# Patient Record
Sex: MALE | Race: WHITE
[De-identification: names, ages, dates, MRNs, and addresses within clinical notes are randomized per-mention and may not be internally consistent; named-entity substitution may affect disease eponyms.]

---

## 2018-01-21 ENCOUNTER — HOSPITAL ENCOUNTER (INPATIENT)
Dept: HOSPITAL 74 - FER | Age: 68
LOS: 3 days | Discharge: HOME | DRG: 378 | End: 2018-01-24
Attending: NURSE PRACTITIONER | Admitting: INTERNAL MEDICINE
Payer: COMMERCIAL

## 2018-01-21 VITALS — BODY MASS INDEX: 37.9 KG/M2

## 2018-01-21 DIAGNOSIS — Z87.891: ICD-10-CM

## 2018-01-21 DIAGNOSIS — R19.7: ICD-10-CM

## 2018-01-21 DIAGNOSIS — K55.9: ICD-10-CM

## 2018-01-21 DIAGNOSIS — R42: ICD-10-CM

## 2018-01-21 DIAGNOSIS — I48.0: ICD-10-CM

## 2018-01-21 DIAGNOSIS — D72.829: ICD-10-CM

## 2018-01-21 DIAGNOSIS — E86.0: ICD-10-CM

## 2018-01-21 DIAGNOSIS — Z86.010: ICD-10-CM

## 2018-01-21 DIAGNOSIS — K62.5: Primary | ICD-10-CM

## 2018-01-21 LAB
ALBUMIN SERPL-MCNC: 4.1 G/DL (ref 3.5–5)
ALP SERPL-CCNC: 56 U/L (ref 32–92)
ALT SERPL-CCNC: 29 U/L (ref 10–40)
ANION GAP SERPL CALC-SCNC: 8 MMOL/L (ref 8–16)
AST SERPL-CCNC: 28 U/L (ref 10–42)
BASOPHILS # BLD: 1.8 % (ref 0–2)
BILIRUB SERPL-MCNC: 0.8 MG/DL (ref 0.2–1)
BUN SERPL-MCNC: 14 MG/DL (ref 7–18)
CALCIUM SERPL-MCNC: 9.5 MG/DL (ref 8.4–10.2)
CHLORIDE SERPL-SCNC: 97 MMOL/L (ref 98–107)
CO2 SERPL-SCNC: 27 MMOL/L (ref 22–28)
CREAT SERPL-MCNC: 1 MG/DL (ref 0.6–1.3)
DEPRECATED RDW RBC AUTO: 13.2 % (ref 11.9–15.9)
EOSINOPHIL # BLD: 0.3 % (ref 0–4.5)
GLUCOSE SERPL-MCNC: 114 MG/DL (ref 74–106)
HCT VFR BLD CALC: 48.1 % (ref 35.4–49)
HGB BLD-MCNC: 15.6 GM/DL (ref 11.7–16.9)
LIPASE SERPL-CCNC: 18 U/L (ref 22–51)
LYMPHOCYTES # BLD: 9.7 % (ref 8–40)
MCH RBC QN AUTO: 28.6 PG (ref 25.7–33.7)
MCHC RBC AUTO-ENTMCNC: 32.5 G/DL (ref 32–35.9)
MCV RBC: 88 FL (ref 80–96)
MONOCYTES # BLD AUTO: 5.6 % (ref 3.8–10.2)
NEUTROPHILS # BLD: 82.6 % (ref 42.8–82.8)
PLATELET # BLD AUTO: 255 K/MM3 (ref 134–434)
PMV BLD: 9.4 FL (ref 7.5–11.1)
POTASSIUM SERPLBLD-SCNC: 4.2 MMOL/L (ref 3.5–5.1)
PROT SERPL-MCNC: 6.8 G/DL (ref 6.4–8.3)
RBC # BLD AUTO: 5.47 M/MM3 (ref 4–5.6)
SODIUM SERPL-SCNC: 132 MMOL/L (ref 136–145)
WBC # BLD AUTO: 15.2 K/MM3 (ref 4–10.8)

## 2018-01-21 PROCEDURE — G0378 HOSPITAL OBSERVATION PER HR: HCPCS

## 2018-01-21 NOTE — HP
CHIEF COMPLAINT: Rectal Bleeding, Diarrhea





PCP: Dr. Christopher Villarreal





HISTORY OF PRESENT ILLNESS:


This is a 66 y/o man with a PMHx of Afib (on Tenormin). Who presents to the ED 

with diarrhea and rectal bleeding x 2 days. Patient reports having 7 Dental 

Implants last Tuesday and was started on Augmentin. He reports having diarrhea 

on Thursday. He reports that with the stools he noted blood. He reports having 

25 episodes of diarrhea. Now during the interview, patient reports having BRBPR 

with clots, abdominal cramping and pressure. Patient reports having a baseline 

Colonoscopy age 48- polyps, Repeat 3 yrs ago- negative, per patient. Patient 

denies fever, chills, cough, SOB, CP, palpitations, N/V, constipation, dysuria.





ER course was notable for:


(1) Stool occult-negative


(2) WBC 15.2


(3) Na 132





Recent Travel: None





PAST MEDICAL HISTORY:


Afib





PAST SURGICAL HISTORY:


Colonoscopy





Social History:


Smoking: Former 20yrs ago


Alcohol:  None


Drugs:    None





Family History:





Allergies





No Known Allergies Allergy (Verified 01/21/18 19:28)


 








HOME MEDICATIONS:


 Home Medications











 Medication  Instructions  Recorded


 


Atenolol [Tenormin -] 25 mg PO BID 01/21/18








REVIEW OF SYSTEMS


CONSTITUTIONAL: 


Absent:  fever, chills, diaphoresis, generalized weakness, malaise, loss of 

appetite, weight change


HEENT: 


Absent:  rhinorrhea, nasal congestion, throat pain, throat swelling, difficulty 

swallowing, mouth swelling, ear pain, eye pain, visual changes


CARDIOVASCULAR: 


Absent: chest pain, syncope, palpitations, irregular heart rate, lightheadedness

, peripheral edema


RESPIRATORY: 


Absent: cough, shortness of breath, dyspnea with exertion, orthopnea, wheezing, 

stridor, hemoptysis


GASTROINTESTINAL: abdominal pain, vomiting, diarrhea, hematochezia


Absent: abdominal distension, nausea, constipation, melena


GENITOURINARY: 


Absent: dysuria, frequency, urgency, hesitancy, hematuria, flank pain, genital 

pain


MUSCULOSKELETAL: 


Absent: myalgia, arthralgia, joint swelling, back pain, neck pain


SKIN: 


Absent: rash, itching, pallor


HEMATOLOGIC/IMMUNOLOGIC: 


Absent: easy bleeding, easy bruising, lymphadenopathy, frequent infections


ENDOCRINE:


Absent: unexplained weight gain, unexplained weight loss, heat intolerance, 

cold intolerance


NEUROLOGIC: 


Absent: headache, focal weakness or paresthesias, dizziness, unsteady gait, 

seizure, mental status changes, bladder or bowel incontinence


PSYCHIATRIC: 


Absent: anxiety, depression, suicidal or homicidal ideation, hallucinations.








PHYSICAL EXAMINATION


 Vital Signs - 24 hr











  01/21/18 01/21/18 01/21/18





  19:31 21:20 22:00


 


Temperature 97.4 F L  


 


Pulse Rate 60  


 


Pulse Rate [  63 





Left]   


 


Pulse Rate [   60





Right]   


 


Respiratory 16 16 





Rate   


 


Blood Pressure 153/85  


 


Blood Pressure  130/69 132/73





[Left]   


 


O2 Sat by Pulse 100  97





Oximetry (%)   














  01/21/18 01/21/18





  22:05 22:10


 


Temperature  


 


Pulse Rate  


 


Pulse Rate [  61





Left]  


 


Pulse Rate [ 61 





Right]  


 


Respiratory  





Rate  


 


Blood Pressure  


 


Blood Pressure 121/74 132/78





[Left]  


 


O2 Sat by Pulse 95 95





Oximetry (%)  











GENERAL: Awake, alert, and fully oriented, in no acute distress.


HEAD: Normal with no signs of trauma.


EYES: Pupils equal, round and reactive to light, extraocular movements intact, 

sclera anicteric, conjunctiva clear. No lid lag.


EARS, NOSE, THROAT: Ears normal, nares patent, oropharynx clear without 

exudates. Moist mucous membranes.


NECK: Normal range of motion, supple without lymphadenopathy, JVD, or masses.


LUNGS: Breath sounds equal, clear to auscultation bilaterally. No wheezes, and 

no crackles. No accessory muscle use.


HEART: Regular rate and rhythm, normal S1 and S2 without murmur, rub or gallop.


ABDOMEN: Soft, not distended, no guarding, no rebound, no masses.  No 

hepatomegaly or  splenomegaly. lower abdominal tenderness,  hyperactive bowel 

sounds,


RECTUM: No active bleed


MUSCULOSKELETAL: Normal range of motion at all joints. No bony deformities or 

tenderness. No CVA tenderness.


UPPER EXTREMITIES: 2+ pulses, warm, well-perfused. No cyanosis. No clubbing. No 

peripheral edema.


LOWER EXTREMITIES: 2+ pulses, warm, well-perfused. No calf tenderness. No 

peripheral edema. 


NEUROLOGICAL:  Cranial nerves II-XII intact. Normal speech. Gait not observed


PSYCHIATRIC: Cooperative. Good eye contact. Appropriate mood and affect.


SKIN: Warm, dry, normal turgor, no rashes or lesions noted, normal capillary 

refill. 





 Laboratory Results - last 24 hr











  01/21/18 01/21/18 01/21/18





  20:20 20:20 21:30


 


WBC  15.2 H  


 


RBC  5.47  


 


Hgb  15.6  


 


Hct  48.1  


 


MCV  88.0  


 


MCH  28.6  


 


MCHC  32.5  


 


RDW  13.2  


 


Plt Count  255  


 


MPV  9.4  


 


Neutrophils %  82.6  


 


Lymphocytes %  9.7  


 


Monocytes %  5.6  


 


Eosinophils %  0.3  


 


Basophils %  1.8  


 


Sodium   132 L 


 


Potassium   4.2 


 


Chloride   97 L 


 


Carbon Dioxide   27 


 


Anion Gap   8 


 


BUN   14 


 


Creatinine   1.0 


 


Creat Clearance w eGFR   > 60 


 


Random Glucose   114 H 


 


Calcium   9.5 


 


Total Bilirubin   0.8 


 


AST   28 


 


ALT   29 


 


Alkaline Phosphatase   56 


 


Total Protein   6.8 


 


Albumin   4.1 


 


Lipase   18 L 


 


Stool Occult Blood    Negative











ASSESSMENT/PLAN:


66 y/o man with Afib. Placed on Tele Observation Diarrhea, Rectal Bleeding.








Plan:


1. Rectal bleeding:


- Tele Monitoring


- Stool Occult- negative


- Appreciate GI Consult


- Repeat CBC, BMP in am


- CTAP in am


- Gentle IVF


- Monitor vitals





2. Diarrhea:


- r/o C-diff


- Recent ABX use for dental prophylaxis


- C Diff Culture-pending


- Stool Culture-pending


- Vancomycin, Flagyl given in ED


- Will continue Flagyl


- Gentle IVF


- BMP in am


- Replete lytes prn





3. Leukocytosis


- Likely infectious vs inflammatory vs malignant


- Blood Cultures-pending


- Continue ABX


- Monitor CBC





4. Afib


- EKG reviewed- SB


- Continue Tenormin with parameters


- [id number] 1





5. FEN


- D51/2NS@75ml/hr


- Replete lytes prn


- NPO in am, then Advance to Clear as tolerated





6. DVT Prophylaxis


- OOB


- SCDs


- No ACs secondary to Rectal Bleed





Code Status: Full Code





Dispo: Tele Observation























Visit type





- Emergency Visit


Emergency Visit: Yes


ED Registration Date: 01/21/18


Care time: The patient presented to the Emergency Department on the above date 

and was hospitalized for further evaluation of their emergent condition.





- New Patient


This patient is new to me today: Yes


Date on this admission: 01/21/18





- Critical Care


Critical Care patient: No

## 2018-01-21 NOTE — PDOC
History of Present Illness





- General


History Source: Patient


Exam Limitations: No Limitations





- History of Present Illness


Initial Comments: 





01/21/18 20:27


The patient is a 67 year old male, with a significant past medical history of 

atrial fibrillation, who presents to the emergency department with, diarrhea 

and rectal bleeding. The patient reports getting dental implants and was 

prescribed antibiotics. He reports light diarrhea for the past two days. He 

reports at midday today he saw trace blood since then, he reports increasingly 

more blood in his stool. As per patient, his stool to currently be primarily 

blood and water. He reports approximately 25 episode of diarrhea today. The 

patient also reports painful LQ spasms.





He denies any recent fevers, chills, headache or dizziness. He denies any 

recent nausea, vomit, or constipation. He denies any recent chest pain or 

shortness of breath. He denies any recent dysuria, frequency, urgency or 

hematuria.





PAST MEDICAL HISTORY:  Atrial fibrillation.


PAST SURGICAL HISTORY:  no significant history


FAMILY HISTORY:  no pertinent history


SOCIAL HISTORY:  Pt lives with  family and is employed.


MEDICATIONS:  reviewed


ALLERGIES:  As per nursing notes








ROS:





General:  No fevers or chills, no weakness, no weight loss 


HEENT: No change in vision.  No sore throat,. No ear pain


CardioVascular:  No chest pain or shortness of breath


Respiratory:No cough, or wheezing. 


Gastrointestinal: +Diarrhea. +Rectal bleeding. no nausea, vomiting, or 

constipation


Genitourinary:  No dysuria, hematuria, or frequency


Musculoskeletal:  No joint or muscle pain or swelling


Neurologic: No headache, vertigo, dizziness or loss of consciousness


Psychiatric: nor depression 


Skin: No rashes or easy bruising


Endocrine: no increased thirst or abnormal weight change


Allergic: no skin or latex allergy


All other systems reviewed and normal





EXAM:


General: Well-nourished well-developed individual, no acute distress


HEENT: Throat: Normal, tonsils normal, no erythema or exudate


Neck: Supple, no meningeal signs, no lymphadenopathy


Eyes::Pupils equal reactive and round, extraocular motion intact


Chest: Nontender to palpation 


Cardiac: S1-S2 normal, regular rate and rhythm, no murmurs rubs or gallops


Respiratory: Lungs clear to auscultation bilateral


Abdomen: Soft, nondistended, normal bowel sounds, nontender to palpation 

diffusely


Rectal: +Rectal vault empty, trace liquid brown stool.


Extremities: Warm, dry, no cyanosis, clubbing, or edema


Skin: No rashes


Neuro: Alert and oriented x3, nonfocal exam, grossly intact, normal gait


Psych: Normal mood and affect














<Ayde Espinal - Last Filed: 01/21/18 21:49>





- History of Present Illness


Initial Comments: 


 


A portion of this note was documented by scribe services under my direction. I 

have reviewed the details of the note, within reason, and agree with the 

documentation.  The case summary and management plan written by me. 





Medical decision making this is a 67-year-old male who comes in complaining of 

multiple episodes of profuse watery diarrhea with some blood in it. Diarrhea 

began post a course of Augmentin that he finished a day before the diarrhea 

began. Patient denies history of similar episodes in the past secondary to 

antibiotic use. History is suspicious for a C. difficile colitis. 


Will obtain workup to include CBC, comp, lipase and stool specimen if patient 

is able to provide it.


We'll obtain EKG and chest x-ray 


We'll reassess and follow up on the evaluation





01/21/18 22:00


Reassessment patient clinical condition is improved at this time


Patient did have a witnessed near syncopal type episode after going to the 

bathroom earlier. 








Assessment and plan:


This is a 67-year-old male who comes in complaining of multiple episodes of 

diarrhea after finishing a course of antibiotic.





Patient has had 1 episode of diarrhea in the emergency room however was not 

able to obtain a specimen from it as patient did not tell us he was owing to 

the bathroom.


Patient's rectal exam was guaiac negative


Patient does have an elevated white count but otherwise lab work is 

unremarkable. Patient started on Flagyl for presumptive C. difficile colitis. 

Patient will be admitted to an inpatient telemetry bed given his near syncopal 

episode. Discussed admission with the admitting hospitalist who was accepted 

the patient for a


Admission to a observation telemetry bed.





Signout given to  the admitting hospitalist, who accepts the patient. Discussed 

plan with the patient  family at bedside, Patient and family aware of the plan 

and agree. Patient is clinically unchanged and stable. 














<Brijesh Renee - Last Filed: 01/21/18 22:25>





- General


Chief Complaint: Rectal Bleed


Stated Complaint: RECTAL BLEED


Time Seen by Provider: 01/21/18 19:40





Past History





<Ayde Espinal - Last Filed: 01/21/18 21:49>





- Past Medical History


COPD: No


HTN: Yes


Other medical history: IMPLANT ON TUESDAY





- Suicide/Smoking/Psychosocial Hx


Smoking History: Former smoker


Have you smoked in the past 12 months: No


If you are a former smoker, when did you quit?: 25 YEARS


Information on smoking cessation initiated: No


Hx Alcohol Use: No


Drug/Substance Use Hx: No


Substance Use Type: None





<Brijesh Renee - Last Filed: 01/21/18 22:25>





- Past Medical History


Allergies/Adverse Reactions: 


 Allergies











Allergy/AdvReac Type Severity Reaction Status Date / Time


 


No Known Allergies Allergy   Verified 01/21/18 19:28











Home Medications: 


Ambulatory Orders





Atenolol [Tenormin -] 25 mg PO BID 01/21/18 











*Physical Exam





- Vital Signs


 Last Vital Signs











Temp Pulse Resp BP Pulse Ox


 


 97.4 F L  60   16   153/85   100 


 


 01/21/18 19:31  01/21/18 19:31  01/21/18 19:31  01/21/18 19:31  01/21/18 19:31














<Ayde Espinal - Last Filed: 01/21/18 21:49>





- Vital Signs


 Last Vital Signs











Temp Pulse Resp BP Pulse Ox


 


 97.4 F L  60   16   153/85   100 


 


 01/21/18 19:31  01/21/18 19:31  01/21/18 19:31  01/21/18 19:31  01/21/18 19:31














<Brijesh Renee - Last Filed: 01/21/18 22:25>





ED Treatment Course





- LABORATORY


CBC & Chemistry Diagram: 


 01/21/18 20:20





 01/21/18 20:20





<Ayde Espinal - Last Filed: 01/21/18 21:49>





- LABORATORY


CBC & Chemistry Diagram: 


 01/21/18 20:20





 01/21/18 20:20





<Brijesh Renee - Last Filed: 01/21/18 22:25>





*DC/Admit/Observation/Transfer





- Attestations


Scribe Attestion: 





01/21/18 20:33





Documentation prepared by Ayde Espinal, acting as medical scribe for 

Brijesh Renee MD.





<Ayde Espinal - Last Filed: 01/21/18 21:49>





- Discharge Dispostion


Admit: Yes





<Brijesh Renee - Last Filed: 01/21/18 22:25>


Diagnosis at time of Disposition: 


 Near syncope, Dehydration





Diarrhea


Qualifiers:


 Diarrhea type: presumed infectious Qualified Code(s): R19.7 - Diarrhea, 

unspecified








- Discharge Dispostion


Condition at time of disposition: Stable

## 2018-01-22 LAB
ANION GAP SERPL CALC-SCNC: 7 MMOL/L (ref 8–16)
BASOPHILS # BLD: 0 % (ref 0–2)
BUN SERPL-MCNC: 11 MG/DL (ref 7–18)
CALCIUM SERPL-MCNC: 8.5 MG/DL (ref 8.4–10.2)
CHLORIDE SERPL-SCNC: 99 MMOL/L (ref 98–107)
CO2 SERPL-SCNC: 27 MMOL/L (ref 22–28)
CREAT SERPL-MCNC: 0.9 MG/DL (ref 0.6–1.3)
DEPRECATED RDW RBC AUTO: 12.9 % (ref 11.9–15.9)
EOSINOPHIL # BLD: 0.2 % (ref 0–4.5)
GLUCOSE SERPL-MCNC: 115 MG/DL (ref 74–106)
HCT VFR BLD CALC: 45.6 % (ref 35.4–49)
HGB BLD-MCNC: 15.3 GM/DL (ref 11.7–16.9)
LYMPHOCYTES # BLD: 9.3 % (ref 8–40)
MAGNESIUM SERPL-MCNC: 2 MG/DL (ref 1.8–2.4)
MCH RBC QN AUTO: 29.6 PG (ref 25.7–33.7)
MCHC RBC AUTO-ENTMCNC: 33.6 G/DL (ref 32–35.9)
MCV RBC: 88.2 FL (ref 80–96)
MONOCYTES # BLD AUTO: 6.3 % (ref 3.8–10.2)
NEUTROPHILS # BLD: 84.2 % (ref 42.8–82.8)
PHOSPHATE SERPL-MCNC: 2.8 MG/DL (ref 2.5–4.6)
PLATELET # BLD AUTO: 224 K/MM3 (ref 134–434)
PMV BLD: 9.3 FL (ref 7.5–11.1)
POTASSIUM SERPLBLD-SCNC: 3.9 MMOL/L (ref 3.5–5.1)
RBC # BLD AUTO: 5.16 M/MM3 (ref 4–5.6)
SODIUM SERPL-SCNC: 133 MMOL/L (ref 136–145)
WBC # BLD AUTO: 13.6 K/MM3 (ref 4–10.8)

## 2018-01-22 RX ADMIN — ATENOLOL SCH MG: 25 TABLET ORAL at 09:30

## 2018-01-22 RX ADMIN — DICYCLOMINE HYDROCHLORIDE PRN MG: 10 CAPSULE ORAL at 21:12

## 2018-01-22 RX ADMIN — RANITIDINE SCH MG: 150 TABLET ORAL at 21:15

## 2018-01-22 RX ADMIN — ATENOLOL SCH MG: 25 TABLET ORAL at 21:15

## 2018-01-22 RX ADMIN — METRONIDAZOLE SCH MLS/HR: 500 INJECTION, SOLUTION INTRAVENOUS at 01:19

## 2018-01-22 RX ADMIN — RANITIDINE SCH MG: 150 TABLET ORAL at 11:39

## 2018-01-22 RX ADMIN — METRONIDAZOLE SCH MLS/HR: 500 INJECTION, SOLUTION INTRAVENOUS at 09:30

## 2018-01-22 RX ADMIN — LEVOFLOXACIN SCH MG: 500 TABLET, FILM COATED ORAL at 14:00

## 2018-01-22 NOTE — PN
Progress Note (short form)





- Note


Progress Note: 





Patient seen and chart/labs reviewed; consult note dictated.


Patient with acute LGI bleeding , diarrhea and cramps after several days of 

AugmentinPO. Stool C diff toxin negative.


CT c/w colitis  (no mass seen).


Suspect either ischemic colitis (pt with hx of A fib), antibioitic associated 

diarrhea or C diff in spte of 1 negative toxin assay.





Rec


1. PO liquids


2. PO cipro and PO Flagyl


3. Stool for C and S


4. Monitor CBC, abdominal exam and BMs


5. Consider flex sig if symptoms do no improve in 1-2 days


6. prn Bentyl for cramps

## 2018-01-22 NOTE — CONS
DATE OF CONSULTATION:  01/22/2018

 

REQUESTING PROVIDER:  Babita Meade NP

 

I was asked to evaluate this 67-year-old gentleman with diarrhea and some blood per

rectum. The patient is a 67-year-old gentleman with a history of atrial fibrillation

in the past; on Tenormin and aspirin.  He also had recent dental surgery a week ago,

for which he received Motrin for pain and Augmentin.  He developed diarrhea

approximately 1 week after being on Augmentin and over the past 1-2 days has had low

abdominal cramps with multiple episodes of diarrhea and some bright red blood per

rectum.  He reports having had a colonoscopy in the past with polyps at age 48, but a

normal study 3 years ago.  He generally has regular bowel movements daily.  At the

time of admission, he was noted to have a white count of 15.1 with a hemoglobin of

15.6.  The patient has had some further diarrhea and a small amount of blood since

admission.  His most recent hematocrit is 45.6.  His red cell indices are normal and

his white count is 13.6.  A stool test for clostridium difficile was negative.  A CT

scan of the abdomen and pelvis is reported to show a long segment of annular wall

thickening in the colon from the distal transverse colon through to the sigmoid colon

with submucosal edema and pericolic fat stranding.  This is consistent with a

segmental colitis.  The patient denies any prior history of colitis or

diverticulosis.  In general, his appetite and weight have remained stable.  

 

PHYSICAL EXAMINATION:   

General:  He is a well-developed, well-nourished gentleman, indicating some lower

abdominal cramps.  

Vital signs:  He is afebrile with stable vital signs.  

HEENT:  Conjunctivae are pink.  

Lungs:  Clear.

Cardiac:  Regular rate and rhythm.

Abdomen:  He has a soft abdomen.  Normoactive bowel sounds and mild discomfort in the

lower abdomen.  

 

LABORATORY TESTS:  Are as above mentioned with a white count of 13.6, hemoglobin of

15.3, and hematocrit of 45.6.  He has normal electrolytes, liver, chemistries.  

 

Patient with recent diarrhea and some blood per rectum.  He has a CT scan showing

what appears to be a colitis from the distal transverse colon to the sigmoid colon,

possibly infectious versus ischemic.  Currently with the lower abdominal discomfort

and mild leukocytosis.  I would place the patient either on IV or oral antibiotics

and a liquid diet and follow clinically.  I would defer lower GI endoscopy at the

present time.  I will repeat the stool for clostridium difficile toxin if the

symptoms do not improve.  I will consider a flexible sigmoidoscopy if the symptoms

persist or worsen.  I will follow. 

 

 

LUIS ENRIQUE MARIA M.D.

 

[id number]

DD: 01/22/2018 13:48

DT: 01/22/2018 19:20

Job #:  [id number]

## 2018-01-22 NOTE — PN
Progress Note (short form)





- Note


Progress Note: 





informed by my office ([place]) consult cancelled.

## 2018-01-22 NOTE — EKG
Test Reason : 

Blood Pressure : ***/*** mmHG

Vent. Rate : 054 BPM     Atrial Rate : 054 BPM

   P-R Int : 166 ms          QRS Dur : 098 ms

    QT Int : 434 ms       P-R-T Axes : 056 036 045 degrees

   QTc Int : 411 ms

 

SINUS BRADYCARDIA

OTHERWISE NORMAL ECG

NO PREVIOUS ECGS AVAILABLE

Confirmed by Anthony Ritter ([id number]) on 1/22/2018 8:50:21 AM

 

Referred By: BARRY PENNINGTON           Confirmed By:Anthony Ritter

## 2018-01-23 LAB
ALBUMIN SERPL-MCNC: 3.4 G/DL (ref 3.5–5)
ALP SERPL-CCNC: 48 U/L (ref 32–92)
ALT SERPL-CCNC: 17 U/L (ref 10–40)
ANION GAP SERPL CALC-SCNC: 5 MMOL/L (ref 8–16)
AST SERPL-CCNC: 16 U/L (ref 10–42)
BASOPHILS # BLD: 0.5 % (ref 0–2)
BILIRUB SERPL-MCNC: 0.6 MG/DL (ref 0.2–1)
BUN SERPL-MCNC: 8 MG/DL (ref 7–18)
CALCIUM SERPL-MCNC: 8.9 MG/DL (ref 8.4–10.2)
CHLORIDE SERPL-SCNC: 104 MMOL/L (ref 98–107)
CO2 SERPL-SCNC: 28 MMOL/L (ref 22–28)
CREAT SERPL-MCNC: 1.1 MG/DL (ref 0.6–1.3)
DEPRECATED RDW RBC AUTO: 13.6 % (ref 11.9–15.9)
EOSINOPHIL # BLD: 0.6 % (ref 0–4.5)
GLUCOSE SERPL-MCNC: 95 MG/DL (ref 74–106)
HCT VFR BLD CALC: 46.1 % (ref 35.4–49)
HGB BLD-MCNC: 15.2 GM/DL (ref 11.7–16.9)
LYMPHOCYTES # BLD: 15.2 % (ref 8–40)
MCH RBC QN AUTO: 28.8 PG (ref 25.7–33.7)
MCHC RBC AUTO-ENTMCNC: 32.9 G/DL (ref 32–35.9)
MCV RBC: 87.6 FL (ref 80–96)
MONOCYTES # BLD AUTO: 7.4 % (ref 3.8–10.2)
NEUTROPHILS # BLD: 76.3 % (ref 42.8–82.8)
PLATELET # BLD AUTO: 236 K/MM3 (ref 134–434)
PMV BLD: 9.2 FL (ref 7.5–11.1)
POTASSIUM SERPLBLD-SCNC: 4.2 MMOL/L (ref 3.5–5.1)
PROT SERPL-MCNC: 5.8 G/DL (ref 6.4–8.3)
RBC # BLD AUTO: 5.26 M/MM3 (ref 4–5.6)
SODIUM SERPL-SCNC: 137 MMOL/L (ref 136–145)
WBC # BLD AUTO: 12.9 K/MM3 (ref 4–10.8)

## 2018-01-23 RX ADMIN — ATENOLOL SCH MG: 25 TABLET ORAL at 22:34

## 2018-01-23 RX ADMIN — RANITIDINE SCH MG: 150 TABLET ORAL at 22:34

## 2018-01-23 RX ADMIN — ATENOLOL SCH MG: 25 TABLET ORAL at 09:09

## 2018-01-23 RX ADMIN — LEVOFLOXACIN SCH MG: 500 TABLET, FILM COATED ORAL at 05:47

## 2018-01-23 RX ADMIN — RANITIDINE SCH MG: 150 TABLET ORAL at 09:09

## 2018-01-23 RX ADMIN — DICYCLOMINE HYDROCHLORIDE PRN MG: 10 CAPSULE ORAL at 05:47

## 2018-01-23 NOTE — PN
Physical Exam: 


SUBJECTIVE: Patient seen and examined


 pt reports  feeling better, denies hematochezia,abdominal pain, nausea but 

states diarrhea persist but less frequent.





OBJECTIVE:





 Vital Signs











 Period  Temp  Pulse  Resp  BP Sys/Reddy  Pulse Ox


 


 Last 24 Hr  97.5 F-98.8 F  55-74  16-20  105-133/52-75  97-97











GENERAL: The patient is awake, alert, and fully oriented, in no acute distress.


HEAD: Normal with no signs of trauma.


EYES: PERRL, extraocular movements intact, sclera anicteric, conjunctiva clear. 

No ptosis. 


ENT: Ears normal, nares patent, oropharynx clear without exudates, moist mucous 


membranes.


NECK: Trachea midline, full range of motion, supple. 


LUNGS: Breath sounds equal, clear to auscultation bilaterally, no wheezes, no 

crackles, no 


accessory muscle use. 


HEART: Regular rate and rhythm, S1, S2 without murmur, rub or gallop.


ABDOMEN: Soft, nontender, nondistended, normoactive bowel sounds, no guarding, 

no 


rebound, no hepatosplenomegaly, no masses.


EXTREMITIES: 2+ pulses, warm, well-perfused, no edema. 


NEUROLOGICAL: Cranial nerves II through XII grossly intact. Normal speech, gait 

not 


observed.


PSYCH: Normal mood, normal affect.


SKIN: Warm, dry, normal turgor, no rashes or lesions noted














 Laboratory Results - last 24 hr











  01/23/18 01/23/18





  07:45 07:45


 


WBC  12.9 H 


 


RBC  5.26 


 


Hgb  15.2 


 


Hct  46.1 


 


MCV  87.6 


 


MCH  28.8 


 


MCHC  32.9 


 


RDW  13.6 


 


Plt Count  236 


 


MPV  9.2 


 


Neutrophils %  76.3 


 


Lymphocytes %  15.2  D 


 


Monocytes %  7.4 


 


Eosinophils %  0.6  D 


 


Basophils %  0.5  D 


 


Sodium   137


 


Potassium   4.2


 


Chloride   104


 


Carbon Dioxide   28


 


Anion Gap   5 L


 


BUN   8  D


 


Creatinine   1.1  D


 


Creat Clearance w eGFR   > 60


 


Random Glucose   95


 


Calcium   8.9


 


Total Bilirubin   0.6  D


 


AST   16  D


 


ALT   17  D


 


Alkaline Phosphatase   48


 


Total Protein   5.8 L


 


Albumin   3.4 L








Active Medications











Generic Name Dose Route Start Last Admin





  Trade Name Freq  PRN Reason Stop Dose Admin


 


Atenolol  25 mg  01/22/18 10:00  01/23/18 09:09





  Tenormin -  PO   25 mg





  BID KOREY   Administration


 


Dicyclomine HCl  20 mg  01/22/18 14:34  01/23/18 05:47





  Bentyl -  PO   20 mg





  Q6H PRN   Administration





  MUSCLE SPASMS   


 


Levofloxacin  500 mg  01/22/18 13:45  01/23/18 05:47





  Levaquin -  PO   500 mg





  DAILY@0600 KOREY   Administration


 


Metronidazole  500 mg  01/22/18 14:00  01/23/18 05:47





  Flagyl -  PO   500 mg





  TID KOREY   Administration


 


Morphine Sulfate  4 mg  01/22/18 01:47  01/22/18 02:05





  Morphine Sulfate  IVPUSH   4 mg





  Q6H PRN   Administration





  PAIN LEVEL 6-10   


 


Ranitidine HCl  150 mg  01/22/18 10:00  01/23/18 09:09





  Zantac -  PO   150 mg





  BID KOREY   Administration





CxR: No acute pathology 


EKG- SB 


CT abdomen/ pelvis:  Nonspecific infectious vs inflammatory colitis,Hepatic 

steatosis,non- obstructing renal calculi





ASSESSMENT/PLAN:





This is a 66 y/o man with a PMHx of Afib (on Tenormin, who presents to the ED 

with diarrhea and rectal bleeding x 2 days,pt reports s/p dental w/u and has 

been on Augmentin.





 GI


* Rectal bleeding 


- stool OB negative 


- CBC stable 


- last colonscopy was 2 years ago, Dr Floyd, polyps removed, negative as 

per patient, Dr Floyd does not have privileges at this hospital, 


- GI Dr. Mckoy following 





* Diarrhea 


- C-Diff ruled out 


- stool studies pending 


-CT abdomen/pelvis - colitis 


- will cont on Cipro and Flagyl


- leukocytosis trending down, afebrile


- diet advanced to full, will monitor 





 cardiovascular


*paroxysmal afib- SR


- will continue atenolol 





 *FEN


- s/p D51/2NS@75ml/hr


- Replete lytes prn





* DVT Prophylaxis


- OOB


- SCDs


- No ACs secondary to Rectal Bleed





Code Status: Full Code





Dispo: Tele Observation





Visit type





- Emergency Visit


Emergency Visit: Yes


ED Registration Date: 01/22/18


Care time: The patient presented to the Emergency Department on the above date 

and was hospitalized for further evaluation of their emergent condition.





- New Patient


This patient is new to me today: Yes


Date on this admission: 01/23/18





- Critical Care


Critical Care patient: No

## 2018-01-23 NOTE — PN
Progress Note (short form)





- Note


Progress Note: 





Patient on PO Cipro and Flagyl with improving WBC and no further rectal bleeding

; also improving lower abdominal cramps.


Afebrile VSS


Tolerating diet ; slowly being advanced


Abdomen soft +BS nontender





Likely resolving colitis ?ischemic vs infectious.


Would continue diet and advance to soft as tolerated; If stable, can d/c home 

in am on PO antibiotics for 7-10 day course.


Will follow as needed.

## 2018-01-24 VITALS — HEART RATE: 74 BPM | DIASTOLIC BLOOD PRESSURE: 74 MMHG | TEMPERATURE: 98.3 F | SYSTOLIC BLOOD PRESSURE: 126 MMHG

## 2018-01-24 LAB
BASOPHILS # BLD: 0.7 % (ref 0–2)
DEPRECATED RDW RBC AUTO: 13.3 % (ref 11.9–15.9)
EOSINOPHIL # BLD: 2.6 % (ref 0–4.5)
HCT VFR BLD CALC: 46.3 % (ref 35.4–49)
HGB BLD-MCNC: 15.2 GM/DL (ref 11.7–16.9)
LYMPHOCYTES # BLD: 21.8 % (ref 8–40)
MCH RBC QN AUTO: 29.3 PG (ref 25.7–33.7)
MCHC RBC AUTO-ENTMCNC: 32.8 G/DL (ref 32–35.9)
MCV RBC: 89.2 FL (ref 80–96)
MONOCYTES # BLD AUTO: 7.8 % (ref 3.8–10.2)
NEUTROPHILS # BLD: 67.1 % (ref 42.8–82.8)
PLATELET # BLD AUTO: 237 K/MM3 (ref 134–434)
PMV BLD: 9.3 FL (ref 7.5–11.1)
RBC # BLD AUTO: 5.19 M/MM3 (ref 4–5.6)
WBC # BLD AUTO: 9.2 K/MM3 (ref 4–10.8)

## 2018-01-24 RX ADMIN — LEVOFLOXACIN SCH MG: 500 TABLET, FILM COATED ORAL at 06:14

## 2018-01-24 RX ADMIN — ATENOLOL SCH MG: 25 TABLET ORAL at 09:40

## 2018-01-24 RX ADMIN — RANITIDINE SCH MG: 150 TABLET ORAL at 09:40

## 2018-01-24 NOTE — DS
Physical Exam: 


SUBJECTIVE: Patient seen and examined. reports feeling much improved, 

tolerating soft diet.  





OBJECTIVE:This is a 66 y/o man with a PMHx of Afib (on Tenormin). Who presents 

to the ED with diarrhea and rectal bleeding x 2 days. Patient reports having 7 

Dental Implants last Tuesday and was started on Augmentin. He reports having 

diarrhea on Thursday. He reports that with the stools he noted blood. He 

reports having 25 episodes of diarrhea. Now during the interview, patient 

reports having BRBPR with clots, abdominal cramping and pressure. Patient 

reports having a baseline Colonoscopy age 48- polyps, Repeat 3 yrs ago- negative

, per patient. Patient denies fever, chills, cough, SOB, CP, palpitations, N/V, 

constipation, dysuria.





ER course was notable for:


(1) Stool occult-negative


(2) WBC 15.2


(3) Na 132





 Vital Signs











 Period  Temp  Pulse  Resp  BP Sys/Reddy  Pulse Ox


 


 Last 24 Hr  97.9 F-98.7 F  51-57  16-18  /50-78  








PHYSICAL EXAM





GENERAL: The patient is awake, alert, and fully oriented, in no acute distress.


HEAD: Normal with no signs of trauma.


EYES: PERRL, extraocular movements intact, sclera anicteric, conjunctiva clear. 


ENT: Ears normal, nares patent, oropharynx clear without exudates, moist mucous 

membranes.


NECK: Trachea midline, full range of motion, supple. 


LUNGS: Breath sounds equal, clear to auscultation bilaterally, no wheezes, no 

crackles, no accessory muscle use. 


HEART: Regular rate and rhythm, S1, S2 without murmur, rub or gallop.


ABDOMEN: Soft, obese, nontender, nondistended, normoactive bowel sounds, no 

guarding, no rebound, no hepatosplenomegaly, no masses.


EXTREMITIES: 2+ pulses, warm, well-perfused, no edema. 


NEUROLOGICAL: Cranial nerves II through XII grossly intact. Normal speech, gait 

not observed.


PSYCH: Normal mood, normal affect.


SKIN: Warm, dry, normal turgor, no rashes or lesions noted.





LABS


 Laboratory Results - last 24 hr











  01/23/18 01/24/18





  07:45 07:48


 


WBC   9.2


 


RBC   5.19


 


Hgb   15.2


 


Hct   46.3


 


MCV   89.2


 


MCH   29.3


 


MCHC   32.8


 


RDW   13.3


 


Plt Count   237


 


MPV   9.3


 


Neutrophils %   67.1


 


Lymphocytes %   21.8  D


 


Monocytes %   7.8


 


Eosinophils %   2.6  D


 


Basophils %   0.7


 


Sodium  137 


 


Potassium  4.2 


 


Chloride  104 


 


Carbon Dioxide  28 


 


Anion Gap  5 L 


 


BUN  8  D 


 


Creatinine  1.1  D 


 


Creat Clearance w eGFR  > 60 


 


Random Glucose  95 


 


Calcium  8.9 


 


Total Bilirubin  0.6  D 


 


AST  16  D 


 


ALT  17  D 


 


Alkaline Phosphatase  48 


 


Total Protein  5.8 L 


 


Albumin  3.4 L 








 Microbiology





01/21/18 23:25   Stool   Salmonella/Shigella Culture - Preliminary


                            NO ENTERIC PATHOGENS, 24 HOURS, ON PRIMARY PLATES


01/21/18 23:25   Stool   Yersinia Culture - Preliminary


                            NO ENTERIC PATHOGENS, 24 HOURS, ON PRIMARY PLATES


01/21/18 23:25   Stool   Vibrio Culture - Final


                            NO GROWTH OF VIBRIO SPECIES OBTAINED


01/21/18 23:25   Stool   Escherichia coli 0157 Culture - Final


                            NO GROWTH OF E COLI 0157 OBTAINED


01/21/18 22:33   Stool   Clostridium difficile Antigen (ISABELLE) - Final, negative 


01/21/18 22:33   Stool   Clostridium difficile Toxin Assay - Final,  negative 





IMAGING 


CxR: No acute pathology 


EKG- SB 


CT abdomen/ pelvis:  Nonspecific infectious vs inflammatory colitis,Hepatic 

steatosis,non- obstructing renal calculi





HOSPITAL COURSE:





   Patient was admitted from the emergency department for rectal bleeding and 

loose stools, stool cultures negative.  CBC stable, last colonscopy was 2 years 

ago, Dr Floyd, polyps removed, negative as per patient, Dr Floyd does 

not have privileges at this hospital. CT of abdomen/pelvis noted, patient was 

started on levaquin and flagyl.    Dr Mckoy, GI was consulted agreed with flagyl 

and levaquin. Leukocytosis resolved and patient remained afebrile throughout 

admission.  Patient has a past medical history of paroxysmal afib remained in 

SR throughout admission. Atenol was continued





PLAN


-discharge home on levaquin and flagyl


- follow up with PCP and GI within 2 weeks 








Date of Admission:01/22/18





Date of Discharge: 01/24/18











Minutes to complete discharge: 45





Discharge Summary


Reason For Visit: NEAR SYNCOPE, DIARRHEA, DEHYDRATION


Current Active Problems





Dehydration (Acute)


Diarrhea (Acute)


Near syncope (Acute)








Condition: Stable





- Instructions


Referrals: 


Christopher Villarreal [Primary Care Provider] - 





- Home Medications


Comprehensive Discharge Medication List: 


Ambulatory Orders





Atenolol [Tenormin -] 25 mg PO BID 01/21/18 











- Discharge Referral


Referred to [facility] No

## 2018-01-24 NOTE — PN
Progress Note (short form)





- Note


Progress Note: 





Patient more comfortable; no further rectal bleeding or abdominal pain. Still 

with some loose stool/diarrhea. WBC improving and Hct stable.


ON PO antibiotics and tolerating soft/low residue diet.


VSS


Abdomen soft +BS  nontender





Clinically improved; to continue PO antibiotcs x 7 days and low residue diet x 2

-3 weeks.


Will follow as outpatient

## 2019-07-08 ENCOUNTER — APPOINTMENT (OUTPATIENT)
Dept: FAMILY MEDICINE | Facility: CLINIC | Age: 69
End: 2019-07-08

## 2019-07-11 ENCOUNTER — APPOINTMENT (OUTPATIENT)
Dept: FAMILY MEDICINE | Facility: CLINIC | Age: 69
End: 2019-07-11
Payer: MEDICARE

## 2019-07-11 VITALS
HEIGHT: 74 IN | OXYGEN SATURATION: 95 % | SYSTOLIC BLOOD PRESSURE: 138 MMHG | BODY MASS INDEX: 39.91 KG/M2 | WEIGHT: 311 LBS | DIASTOLIC BLOOD PRESSURE: 76 MMHG | HEART RATE: 55 BPM

## 2019-07-11 VITALS — DIASTOLIC BLOOD PRESSURE: 65 MMHG | SYSTOLIC BLOOD PRESSURE: 116 MMHG

## 2019-07-11 DIAGNOSIS — H00.015 HORDEOLUM EXTERNUM LEFT LOWER EYELID: ICD-10-CM

## 2019-07-11 PROCEDURE — 99203 OFFICE O/P NEW LOW 30 MIN: CPT

## 2019-07-11 RX ORDER — ASPIRIN 81 MG
81 TABLET, DELAYED RELEASE (ENTERIC COATED) ORAL
Refills: 0 | Status: COMPLETED | COMMUNITY

## 2019-07-11 NOTE — HEALTH RISK ASSESSMENT
[Good] : ~his/her~  mood as  good [] : No [Yes] : Yes [2 - 4 times a month (2 pts)] : 2-4 times a month (2 points) [1 or 2 (0 pts)] : 1 or 2 (0 points) [No falls in past year] : Patient reported no falls in the past year [0] : 2) Feeling down, depressed, or hopeless: Not at all (0) [Audit-CScore] : 2 [[id number]] : 0 [Patient reported colonoscopy was normal] : Patient reported colonoscopy was normal [HIV test declined] : HIV test declined [Change in mental status noted] : No change in mental status noted [Hepatitis C test declined] : Hepatitis C test declined [Language] : denies difficulty with language [Learning/Retaining New Information] : denies difficulty learning/retaining new information [Behavior] : denies difficulty with behavior [Handling Complex Tasks] : difficulty handling complex tasks [Reasoning] : difficulty with reasoning [Spatial Ability and Orientation] : difficulty with spatial ability and orientation [None] : None [] :  [Sexually Active] : sexually active [High Risk Behavior] : no high risk behavior [Fully functional (bathing, dressing, toileting, transferring, walking, feeding)] : Fully functional (bathing, dressing, toileting, transferring, walking, feeding) [Fully functional (using the telephone, shopping, preparing meals, housekeeping, doing laundry, using] : Fully functional and needs no help or supervision to perform IADLs (using the telephone, shopping, preparing meals, housekeeping, doing laundry, using transportation, managing medications and managing finances) [Reports changes in hearing] : Reports no changes in hearing [Smoke Detector] : smoke detector [Reports changes in dental health] : Reports no changes in dental health [ColonoscopyDate] : 2018 [ColonoscopyComments] : dr Chowdaryz [Reviewed updated] : Reviewed updated

## 2019-07-11 NOTE — HISTORY OF PRESENT ILLNESS
[FreeTextEntry1] : New patient to establish medical care\par f/U chronic medi [de-identified] : Obese male with history of Paroxysmal A Fib\par Old records from dr joe reviewed

## 2019-07-11 NOTE — PLAN
[FreeTextEntry1] : Started on local antibiotic\par recommended warm wet compress\par Call if not improving\par return for blood work in december

## 2019-09-24 ENCOUNTER — TRANSCRIPTION ENCOUNTER (OUTPATIENT)
Age: 69
End: 2019-09-24

## 2019-11-01 ENCOUNTER — APPOINTMENT (OUTPATIENT)
Dept: FAMILY MEDICINE | Facility: CLINIC | Age: 69
End: 2019-11-01
Payer: MEDICARE

## 2019-11-01 VITALS
WEIGHT: 296 LBS | HEIGHT: 74 IN | DIASTOLIC BLOOD PRESSURE: 64 MMHG | HEART RATE: 62 BPM | RESPIRATION RATE: 18 BRPM | OXYGEN SATURATION: 96 % | BODY MASS INDEX: 37.99 KG/M2 | TEMPERATURE: 99.4 F | SYSTOLIC BLOOD PRESSURE: 112 MMHG

## 2019-11-01 PROCEDURE — 99058 OFFICE EMERGENCY CARE: CPT

## 2019-11-01 PROCEDURE — 99214 OFFICE O/P EST MOD 30 MIN: CPT | Mod: 25

## 2019-11-01 PROCEDURE — 93000 ELECTROCARDIOGRAM COMPLETE: CPT

## 2019-11-01 RX ORDER — ERYTHROMYCIN 5 MG/G
5 OINTMENT OPHTHALMIC 4 TIMES DAILY
Qty: 1 | Refills: 1 | Status: DISCONTINUED | COMMUNITY
Start: 2019-07-11 | End: 2019-11-01

## 2019-11-01 NOTE — HISTORY OF PRESENT ILLNESS
[FreeTextEntry8] : Sudden SOB  x 3 day, significant change from his baseline\par Almost unable to climb one flight of stairs because of SOB\par Danya cough\par Reports occasionally episodes of irregular heart beats\par Sent to [facility] for further investigation\par The patient endorsed to Dr Barton

## 2019-11-01 NOTE — HEALTH RISK ASSESSMENT
[Yes] : Yes [2 - 4 times a month (2 pts)] : 2-4 times a month (2 points) [1 or 2 (0 pts)] : 1 or 2 (0 points) [No falls in past year] : Patient reported no falls in the past year [1] : 1) Little interest or pleasure doing things for several days (1) [0] : 2) Feeling down, depressed, or hopeless: Not at all (0) [] : No [Audit-CScore] : 2 [[id number]] : 1

## 2019-11-01 NOTE — ASSESSMENT
[FreeTextEntry1] : New onse t SOB\par Sudden change from baseline\par Unremarkable EKG\par Recent cardiac check up with his cardiologist in [facility] was unremarkable\par Need Evaluation in ER to R/O PE\par \par \par

## 2019-11-01 NOTE — PHYSICAL EXAM
[Normal] : soft, non-tender, non-distended, no masses palpated, no HSM and normal bowel sounds [de-identified] : obese

## 2019-11-02 ENCOUNTER — RESULT REVIEW (OUTPATIENT)
Age: 69
End: 2019-11-02

## 2019-11-04 ENCOUNTER — RESULT REVIEW (OUTPATIENT)
Age: 69
End: 2019-11-04

## 2019-12-30 ENCOUNTER — APPOINTMENT (OUTPATIENT)
Dept: FAMILY MEDICINE | Facility: CLINIC | Age: 69
End: 2019-12-30
Payer: MEDICARE

## 2019-12-30 ENCOUNTER — LABORATORY RESULT (OUTPATIENT)
Age: 69
End: 2019-12-30

## 2019-12-30 VITALS
WEIGHT: 302 LBS | DIASTOLIC BLOOD PRESSURE: 76 MMHG | BODY MASS INDEX: 38.76 KG/M2 | HEIGHT: 74 IN | SYSTOLIC BLOOD PRESSURE: 115 MMHG | RESPIRATION RATE: 14 BRPM | HEART RATE: 56 BPM

## 2019-12-30 DIAGNOSIS — Z86.79 PERSONAL HISTORY OF OTHER DISEASES OF THE CIRCULATORY SYSTEM: ICD-10-CM

## 2019-12-30 LAB
BASOPHILS # BLD AUTO: 0.07 K/UL
BASOPHILS NFR BLD AUTO: 1 %
EOSINOPHIL # BLD AUTO: 0.17 K/UL
EOSINOPHIL NFR BLD AUTO: 2.3 %
HCT VFR BLD CALC: 46 %
HGB BLD-MCNC: 14.6 G/DL
IMM GRANULOCYTES NFR BLD AUTO: 0.7 %
LYMPHOCYTES # BLD AUTO: 1.89 K/UL
LYMPHOCYTES NFR BLD AUTO: 25.7 %
MAN DIFF?: NORMAL
MCHC RBC-ENTMCNC: 28.7 PG
MCHC RBC-ENTMCNC: 31.7 GM/DL
MCV RBC AUTO: 90.6 FL
MONOCYTES # BLD AUTO: 0.57 K/UL
MONOCYTES NFR BLD AUTO: 7.8 %
NEUTROPHILS # BLD AUTO: 4.59 K/UL
NEUTROPHILS NFR BLD AUTO: 62.5 %
PLATELET # BLD AUTO: 204 K/UL
RBC # BLD: 5.08 M/UL
RBC # FLD: 14 %
WBC # FLD AUTO: 7.34 K/UL

## 2019-12-30 PROCEDURE — G0438: CPT

## 2019-12-30 PROCEDURE — 36415 COLL VENOUS BLD VENIPUNCTURE: CPT

## 2019-12-30 PROCEDURE — 99214 OFFICE O/P EST MOD 30 MIN: CPT | Mod: 25

## 2019-12-30 NOTE — PLAN
[FreeTextEntry1] : Recommended to have second opinion  knee evaluation\par Will contact the patient with blood test results\par Renewed the Atenolol\par Immunization is UTD\par Has scheduled cardiac stress test in January 2019\par

## 2019-12-30 NOTE — HISTORY OF PRESENT ILLNESS
[de-identified] : reports chronic right knee pain x i year, did not respond to PT and local injections\par Recently in [place] with pneumonia and in [facility] with episode of A Fib [FreeTextEntry1] : Annual wellness examination\par Right knee pain\par F/u chronic probleems

## 2019-12-30 NOTE — HEALTH RISK ASSESSMENT
[Good] : ~his/her~  mood as  good [Yes] : Yes [Never (0 pts)] : Never (0 points) [2 - 4 times a month (2 pts)] : 2-4 times a month (2 points) [1 or 2 (0 pts)] : 1 or 2 (0 points) [No falls in past year] : Patient reported no falls in the past year [0] : 2) Feeling down, depressed, or hopeless: Not at all (0) [HIV test declined] : HIV test declined [Patient reported colonoscopy was normal] : Patient reported colonoscopy was normal [Hepatitis C test declined] : Hepatitis C test declined [With Family] : lives with family [None] : None [] :  [Retired] : retired [Feels Safe at Home] : Feels safe at home [[id number]] : 0 [] : No [Change in mental status noted] : No change in mental status noted [Behavior] : denies difficulty with behavior [Language] : denies difficulty with language [Learning/Retaining New Information] : denies difficulty learning/retaining new information [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

## 2019-12-30 NOTE — PHYSICAL EXAM
[No Acute Distress] : no acute distress [Well Developed] : well developed [Well Nourished] : well nourished [PERRL] : pupils equal round and reactive to light [Well-Appearing] : well-appearing [Normal Sclera/Conjunctiva] : normal sclera/conjunctiva [Normal Outer Ear/Nose] : the outer ears and nose were normal in appearance [EOMI] : extraocular movements intact [Normal Oropharynx] : the oropharynx was normal [No Lymphadenopathy] : no lymphadenopathy [No JVD] : no jugular venous distention [No Accessory Muscle Use] : no accessory muscle use [Supple] : supple [No Respiratory Distress] : no respiratory distress  [Thyroid Normal, No Nodules] : the thyroid was normal and there were no nodules present [Regular Rhythm] : with a regular rhythm [Normal Rate] : normal rate  [Clear to Auscultation] : lungs were clear to auscultation bilaterally [No Murmur] : no murmur heard [Normal S1, S2] : normal S1 and S2 [No Abdominal Bruit] : a ~M bruit was not heard ~T in the abdomen [No Varicosities] : no varicosities [No Carotid Bruits] : no carotid bruits [No Palpable Aorta] : no palpable aorta [Pedal Pulses Present] : the pedal pulses are present [No Edema] : there was no peripheral edema [Soft] : abdomen soft [Non Tender] : non-tender [No Extremity Clubbing/Cyanosis] : no extremity clubbing/cyanosis [No HSM] : no HSM [No Masses] : no abdominal mass palpated [Non-distended] : non-distended [Normal Anterior Cervical Nodes] : no anterior cervical lymphadenopathy [Normal Bowel Sounds] : normal bowel sounds [Normal Posterior Cervical Nodes] : no posterior cervical lymphadenopathy [No Joint Swelling] : no joint swelling [No Spinal Tenderness] : no spinal tenderness [No CVA Tenderness] : no CVA  tenderness [Grossly Normal Strength/Tone] : grossly normal strength/tone [No Rash] : no rash [Coordination Grossly Intact] : coordination grossly intact [No Focal Deficits] : no focal deficits [Normal Affect] : the affect was normal [Normal Gait] : normal gait [Deep Tendon Reflexes (DTR)] : deep tendon reflexes were 2+ and symmetric [Normal Insight/Judgement] : insight and judgment were intact

## 2019-12-31 LAB
25(OH)D3 SERPL-MCNC: 50.3 NG/ML
ALBUMIN SERPL ELPH-MCNC: 4.5 G/DL
ALP BLD-CCNC: 67 U/L
ALT SERPL-CCNC: 15 U/L
ANION GAP SERPL CALC-SCNC: 14 MMOL/L
AST SERPL-CCNC: 16 U/L
BILIRUB SERPL-MCNC: 0.5 MG/DL
BUN SERPL-MCNC: 13 MG/DL
CALCIUM SERPL-MCNC: 9.4 MG/DL
CHLORIDE SERPL-SCNC: 104 MMOL/L
CHOLEST SERPL-MCNC: 208 MG/DL
CHOLEST/HDLC SERPL: 4.8 RATIO
CO2 SERPL-SCNC: 24 MMOL/L
CREAT SERPL-MCNC: 0.96 MG/DL
ESTIMATED AVERAGE GLUCOSE: 120 MG/DL
GLUCOSE SERPL-MCNC: 89 MG/DL
HBA1C MFR BLD HPLC: 5.8 %
HDLC SERPL-MCNC: 43 MG/DL
IRON SATN MFR SERPL: 31 %
IRON SERPL-MCNC: 108 UG/DL
LDLC SERPL CALC-MCNC: 121 MG/DL
POTASSIUM SERPL-SCNC: 4.3 MMOL/L
PROT SERPL-MCNC: 7 G/DL
PSA SERPL-MCNC: 1.09 NG/ML
SODIUM SERPL-SCNC: 142 MMOL/L
TIBC SERPL-MCNC: 345 UG/DL
TRIGL SERPL-MCNC: 220 MG/DL
TSH SERPL-ACNC: 4.39 UIU/ML
UIBC SERPL-MCNC: 237 UG/DL
VIT B12 SERPL-MCNC: 357 PG/ML

## 2020-01-06 LAB
TESTOST BND SERPL-MCNC: 11.8 PG/ML
TESTOST SERPL-MCNC: 736.8 NG/DL

## 2020-05-20 ENCOUNTER — APPOINTMENT (OUTPATIENT)
Dept: FAMILY MEDICINE | Facility: CLINIC | Age: 70
End: 2020-05-20
Payer: MEDICARE

## 2020-05-20 ENCOUNTER — NON-APPOINTMENT (OUTPATIENT)
Age: 70
End: 2020-05-20

## 2020-05-20 VITALS
RESPIRATION RATE: 16 BRPM | DIASTOLIC BLOOD PRESSURE: 72 MMHG | HEART RATE: 52 BPM | TEMPERATURE: 97.2 F | SYSTOLIC BLOOD PRESSURE: 130 MMHG | BODY MASS INDEX: 38.5 KG/M2 | WEIGHT: 300 LBS | OXYGEN SATURATION: 98 % | HEIGHT: 74 IN

## 2020-05-20 DIAGNOSIS — M23.8X1 OTHER INTERNAL DERANGEMENTS OF RIGHT KNEE: ICD-10-CM

## 2020-05-20 DIAGNOSIS — Z78.9 OTHER SPECIFIED HEALTH STATUS: ICD-10-CM

## 2020-05-20 DIAGNOSIS — R06.02 SHORTNESS OF BREATH: ICD-10-CM

## 2020-05-20 PROCEDURE — 36415 COLL VENOUS BLD VENIPUNCTURE: CPT

## 2020-05-20 PROCEDURE — 99215 OFFICE O/P EST HI 40 MIN: CPT | Mod: 25

## 2020-05-20 PROCEDURE — 93000 ELECTROCARDIOGRAM COMPLETE: CPT

## 2020-05-20 RX ORDER — ASPIRIN 325 MG
TABLET ORAL DAILY
Refills: 0 | Status: ACTIVE | COMMUNITY

## 2020-05-20 NOTE — HISTORY OF PRESENT ILLNESS
[No Adverse Anesthesia Reaction] : no adverse anesthesia reaction in self or family member [Atrial Fibrillation] : atrial fibrillation [(Patient denies any chest pain, claudication, dyspnea on exertion, orthopnea, palpitations or syncope)] : Patient denies any chest pain, claudication, dyspnea on exertion, orthopnea, palpitations or syncope [Chronic Anticoagulation] : no chronic anticoagulation [Chronic Kidney Disease] : no chronic kidney disease [Diabetes] : no diabetes [FreeTextEntry1] : Right knee arthroscopy [FreeTextEntry3] : Dr. Uriel Norris [FreeTextEntry2] : 05/29/2020 [FreeTextEntry4] : Reports injury to right knee last year\par Did not respond to conservative treatment. has impaired mobility and is affecting quality of life [FreeTextEntry7] : NST normal on Jan17, 2020 with his cardiologist Dr. Ruiz, [facility],  in [place], [place]

## 2020-05-20 NOTE — REVIEW OF SYSTEMS
[Joint Pain] : joint pain [Joint Stiffness] : joint stiffness [Joint Swelling] : joint swelling [Negative] : Heme/Lymph [FreeTextEntry9] : right knee

## 2020-05-20 NOTE — PHYSICAL EXAM
[No Acute Distress] : no acute distress [Well Developed] : well developed [Well-Appearing] : well-appearing [Normal Sclera/Conjunctiva] : normal sclera/conjunctiva [PERRL] : pupils equal round and reactive to light [EOMI] : extraocular movements intact [Normal Outer Ear/Nose] : the outer ears and nose were normal in appearance [Normal Oropharynx] : the oropharynx was normal [No JVD] : no jugular venous distention [No Lymphadenopathy] : no lymphadenopathy [Supple] : supple [Thyroid Normal, No Nodules] : the thyroid was normal and there were no nodules present [No Accessory Muscle Use] : no accessory muscle use [No Respiratory Distress] : no respiratory distress  [Clear to Auscultation] : lungs were clear to auscultation bilaterally [Normal Rate] : normal rate  [Regular Rhythm] : with a regular rhythm [Normal S1, S2] : normal S1 and S2 [No Murmur] : no murmur heard [No Carotid Bruits] : no carotid bruits [No Abdominal Bruit] : a ~M bruit was not heard ~T in the abdomen [No Varicosities] : no varicosities [Pedal Pulses Present] : the pedal pulses are present [No Edema] : there was no peripheral edema [No Palpable Aorta] : no palpable aorta [No Extremity Clubbing/Cyanosis] : no extremity clubbing/cyanosis [Soft] : abdomen soft [Non Tender] : non-tender [No Masses] : no abdominal mass palpated [Non-distended] : non-distended [No HSM] : no HSM [Normal Bowel Sounds] : normal bowel sounds [Normal Posterior Cervical Nodes] : no posterior cervical lymphadenopathy [Normal Anterior Cervical Nodes] : no anterior cervical lymphadenopathy [No CVA Tenderness] : no CVA  tenderness [No Spinal Tenderness] : no spinal tenderness [No Joint Swelling] : no joint swelling [Grossly Normal Strength/Tone] : grossly normal strength/tone [No Rash] : no rash [Coordination Grossly Intact] : coordination grossly intact [No Focal Deficits] : no focal deficits [Normal Gait] : normal gait [Deep Tendon Reflexes (DTR)] : deep tendon reflexes were 2+ and symmetric [Normal Insight/Judgement] : insight and judgment were intact [Normal Affect] : the affect was normal [de-identified] : Obese male

## 2020-05-20 NOTE — PLAN
[FreeTextEntry1] : The patient Pre-op labs are attached\par The Ekg show sinus bradycardia likely induced by taking Beta=blockers\par The patient is medically stable and cleared for anesthesia and surgery

## 2020-05-21 LAB
ALBUMIN SERPL ELPH-MCNC: 4.2 G/DL
ALP BLD-CCNC: 67 U/L
ALT SERPL-CCNC: 16 U/L
ANION GAP SERPL CALC-SCNC: 12 MMOL/L
APTT BLD: 32.2 SEC
AST SERPL-CCNC: 17 U/L
BASOPHILS # BLD AUTO: 0.05 K/UL
BASOPHILS NFR BLD AUTO: 0.7 %
BILIRUB SERPL-MCNC: 0.4 MG/DL
BUN SERPL-MCNC: 14 MG/DL
CALCIUM SERPL-MCNC: 9.3 MG/DL
CHLORIDE SERPL-SCNC: 105 MMOL/L
CO2 SERPL-SCNC: 27 MMOL/L
CREAT SERPL-MCNC: 1.03 MG/DL
EOSINOPHIL # BLD AUTO: 0.14 K/UL
EOSINOPHIL NFR BLD AUTO: 2 %
GLUCOSE SERPL-MCNC: 96 MG/DL
HCT VFR BLD CALC: 46.4 %
HGB BLD-MCNC: 14.5 G/DL
IMM GRANULOCYTES NFR BLD AUTO: 0.6 %
INR PPP: 0.93 RATIO
LYMPHOCYTES # BLD AUTO: 1.63 K/UL
LYMPHOCYTES NFR BLD AUTO: 23.8 %
MAN DIFF?: NORMAL
MCHC RBC-ENTMCNC: 29.1 PG
MCHC RBC-ENTMCNC: 31.3 GM/DL
MCV RBC AUTO: 93.2 FL
MONOCYTES # BLD AUTO: 0.49 K/UL
MONOCYTES NFR BLD AUTO: 7.2 %
NEUTROPHILS # BLD AUTO: 4.49 K/UL
NEUTROPHILS NFR BLD AUTO: 65.7 %
PLATELET # BLD AUTO: 188 K/UL
POTASSIUM SERPL-SCNC: 4.5 MMOL/L
PROT SERPL-MCNC: 6.4 G/DL
PT BLD: 10.7 SEC
RBC # BLD: 4.98 M/UL
RBC # FLD: 14.1 %
SARS-COV-2 IGG SERPL IA-ACNC: <0.1 INDEX
SARS-COV-2 IGG SERPL QL IA: NEGATIVE
SODIUM SERPL-SCNC: 143 MMOL/L
WBC # FLD AUTO: 6.84 K/UL

## 2020-12-10 ENCOUNTER — NON-APPOINTMENT (OUTPATIENT)
Age: 70
End: 2020-12-10

## 2020-12-30 ENCOUNTER — APPOINTMENT (OUTPATIENT)
Dept: FAMILY MEDICINE | Facility: CLINIC | Age: 70
End: 2020-12-30
Payer: MEDICARE

## 2020-12-30 VITALS
TEMPERATURE: 98.7 F | HEART RATE: 56 BPM | BODY MASS INDEX: 37.22 KG/M2 | HEIGHT: 74 IN | SYSTOLIC BLOOD PRESSURE: 124 MMHG | OXYGEN SATURATION: 98 % | RESPIRATION RATE: 16 BRPM | DIASTOLIC BLOOD PRESSURE: 70 MMHG | WEIGHT: 290 LBS

## 2020-12-30 DIAGNOSIS — Z23 ENCOUNTER FOR IMMUNIZATION: ICD-10-CM

## 2020-12-30 DIAGNOSIS — M48.061 SPINAL STENOSIS, LUMBAR REGION WITHOUT NEUROGENIC CLAUDICATION: ICD-10-CM

## 2020-12-30 DIAGNOSIS — Z00.00 ENCOUNTER FOR GENERAL ADULT MEDICAL EXAMINATION W/OUT ABNORMAL FINDINGS: ICD-10-CM

## 2020-12-30 DIAGNOSIS — Z20.822 CONTACT WITH AND (SUSPECTED) EXPOSURE TO COVID-19: ICD-10-CM

## 2020-12-30 DIAGNOSIS — M25.561 PAIN IN RIGHT KNEE: ICD-10-CM

## 2020-12-30 PROCEDURE — G0009: CPT

## 2020-12-30 PROCEDURE — G0439: CPT

## 2020-12-30 PROCEDURE — 90732 PPSV23 VACC 2 YRS+ SUBQ/IM: CPT

## 2020-12-30 PROCEDURE — G0444 DEPRESSION SCREEN ANNUAL: CPT | Mod: 59

## 2020-12-30 PROCEDURE — G0442 ANNUAL ALCOHOL SCREEN 15 MIN: CPT | Mod: 59

## 2020-12-30 PROCEDURE — 36415 COLL VENOUS BLD VENIPUNCTURE: CPT

## 2020-12-30 NOTE — HISTORY OF PRESENT ILLNESS
[FreeTextEntry1] : Annual exam\par F/U chronic problems\par Eyelid ptosis\par episodes of nose bleed\par Pneumovax [de-identified] : Chronic conditions are stable\par The eyelid ptosis affecting the vision\par health maintenance is  UTD

## 2020-12-30 NOTE — PLAN
[FreeTextEntry1] : Need blepharoplasty\par Received Pneumovax-23, left deltoid\par Increase fluids\par F/U cardiology\par Will contact the patient with results\par

## 2020-12-30 NOTE — PHYSICAL EXAM
[No Acute Distress] : no acute distress [Well Nourished] : well nourished [Well Developed] : well developed [Well-Appearing] : well-appearing [Normal Sclera/Conjunctiva] : normal sclera/conjunctiva [PERRL] : pupils equal round and reactive to light [EOMI] : extraocular movements intact [Normal Outer Ear/Nose] : the outer ears and nose were normal in appearance [Normal Oropharynx] : the oropharynx was normal [No JVD] : no jugular venous distention [No Lymphadenopathy] : no lymphadenopathy [Supple] : supple [Thyroid Normal, No Nodules] : the thyroid was normal and there were no nodules present [No Respiratory Distress] : no respiratory distress  [No Accessory Muscle Use] : no accessory muscle use [Clear to Auscultation] : lungs were clear to auscultation bilaterally [Normal Rate] : normal rate  [Regular Rhythm] : with a regular rhythm [Normal S1, S2] : normal S1 and S2 [No Murmur] : no murmur heard [No Carotid Bruits] : no carotid bruits [No Abdominal Bruit] : a ~M bruit was not heard ~T in the abdomen [No Varicosities] : no varicosities [Pedal Pulses Present] : the pedal pulses are present [No Edema] : there was no peripheral edema [No Palpable Aorta] : no palpable aorta [No Extremity Clubbing/Cyanosis] : no extremity clubbing/cyanosis [Soft] : abdomen soft [Non Tender] : non-tender [Non-distended] : non-distended [No Masses] : no abdominal mass palpated [No HSM] : no HSM [Normal Bowel Sounds] : normal bowel sounds [Normal Posterior Cervical Nodes] : no posterior cervical lymphadenopathy [Normal Anterior Cervical Nodes] : no anterior cervical lymphadenopathy [No CVA Tenderness] : no CVA  tenderness [No Spinal Tenderness] : no spinal tenderness [No Joint Swelling] : no joint swelling [Grossly Normal Strength/Tone] : grossly normal strength/tone [No Rash] : no rash [Coordination Grossly Intact] : coordination grossly intact [No Focal Deficits] : no focal deficits [Normal Gait] : normal gait [Normal Affect] : the affect was normal [Normal Insight/Judgement] : insight and judgment were intact [de-identified] : eyelid ptosis

## 2020-12-30 NOTE — HEALTH RISK ASSESSMENT
[Very Good] : ~his/her~  mood as very good [No] : In the past 12 months have you used drugs other than those required for medical reasons? No [No falls in past year] : Patient reported no falls in the past year [0] : 2) Feeling down, depressed, or hopeless: Not at all (0) [Patient reported colonoscopy was normal] : Patient reported colonoscopy was normal [With Family] : lives with family [Retired] : retired [] :  [Fully functional (bathing, dressing, toileting, transferring, walking, feeding)] : Fully functional (bathing, dressing, toileting, transferring, walking, feeding) [Fully functional (using the telephone, shopping, preparing meals, housekeeping, doing laundry, using] : Fully functional and needs no help or supervision to perform IADLs (using the telephone, shopping, preparing meals, housekeeping, doing laundry, using transportation, managing medications and managing finances) [] : No [[id number]] : 0 [Change in mental status noted] : No change in mental status noted [ColonoscopyDate] : 01/18

## 2020-12-31 LAB
25(OH)D3 SERPL-MCNC: 43.5 NG/ML
ALBUMIN SERPL ELPH-MCNC: 4.3 G/DL
ALP BLD-CCNC: 73 U/L
ALT SERPL-CCNC: 13 U/L
ANION GAP SERPL CALC-SCNC: 13 MMOL/L
AST SERPL-CCNC: 19 U/L
BASOPHILS # BLD AUTO: 0.05 K/UL
BASOPHILS NFR BLD AUTO: 0.8 %
BILIRUB SERPL-MCNC: 0.5 MG/DL
BUN SERPL-MCNC: 15 MG/DL
CALCIUM SERPL-MCNC: 9.6 MG/DL
CHLORIDE SERPL-SCNC: 104 MMOL/L
CHOLEST SERPL-MCNC: 197 MG/DL
CO2 SERPL-SCNC: 25 MMOL/L
CREAT SERPL-MCNC: 1.17 MG/DL
EOSINOPHIL # BLD AUTO: 0.14 K/UL
EOSINOPHIL NFR BLD AUTO: 2.3 %
ESTIMATED AVERAGE GLUCOSE: 117 MG/DL
GLUCOSE SERPL-MCNC: 101 MG/DL
HBA1C MFR BLD HPLC: 5.7 %
HCT VFR BLD CALC: 45.6 %
HDLC SERPL-MCNC: 39 MG/DL
HGB BLD-MCNC: 14.6 G/DL
IMM GRANULOCYTES NFR BLD AUTO: 0.3 %
LDLC SERPL CALC-MCNC: 127 MG/DL
LYMPHOCYTES # BLD AUTO: 1.31 K/UL
LYMPHOCYTES NFR BLD AUTO: 22 %
MAN DIFF?: NORMAL
MCHC RBC-ENTMCNC: 29.4 PG
MCHC RBC-ENTMCNC: 32 GM/DL
MCV RBC AUTO: 91.9 FL
MONOCYTES # BLD AUTO: 0.52 K/UL
MONOCYTES NFR BLD AUTO: 8.7 %
NEUTROPHILS # BLD AUTO: 3.92 K/UL
NEUTROPHILS NFR BLD AUTO: 65.9 %
NONHDLC SERPL-MCNC: 158 MG/DL
PLATELET # BLD AUTO: 187 K/UL
POTASSIUM SERPL-SCNC: 4.5 MMOL/L
PROT SERPL-MCNC: 6.6 G/DL
PSA SERPL-MCNC: 1.3 NG/ML
RBC # BLD: 4.96 M/UL
RBC # FLD: 13.8 %
SARS-COV-2 IGG SERPL IA-ACNC: <0.1 INDEX
SARS-COV-2 IGG SERPL QL IA: NEGATIVE
SODIUM SERPL-SCNC: 142 MMOL/L
TRIGL SERPL-MCNC: 153 MG/DL
TSH SERPL-ACNC: 2.22 UIU/ML
VIT B12 SERPL-MCNC: 245 PG/ML
WBC # FLD AUTO: 5.96 K/UL

## 2021-01-15 ENCOUNTER — APPOINTMENT (OUTPATIENT)
Dept: FAMILY MEDICINE | Facility: CLINIC | Age: 71
End: 2021-01-15
Payer: MEDICARE

## 2021-01-15 VITALS
BODY MASS INDEX: 37.22 KG/M2 | OXYGEN SATURATION: 98 % | RESPIRATION RATE: 16 BRPM | SYSTOLIC BLOOD PRESSURE: 126 MMHG | TEMPERATURE: 98.7 F | WEIGHT: 290 LBS | HEIGHT: 74 IN | DIASTOLIC BLOOD PRESSURE: 70 MMHG | HEART RATE: 58 BPM

## 2021-01-15 DIAGNOSIS — E29.1 TESTICULAR HYPOFUNCTION: ICD-10-CM

## 2021-01-15 PROCEDURE — 36415 COLL VENOUS BLD VENIPUNCTURE: CPT

## 2021-01-15 PROCEDURE — 99213 OFFICE O/P EST LOW 20 MIN: CPT | Mod: 25

## 2021-01-15 NOTE — HISTORY OF PRESENT ILLNESS
[FreeTextEntry1] : F/U chronic problems\par Diicuss lans [de-identified] : Chronic conditions are stable\par no complaints\par Wants to know his testosterone level

## 2021-01-15 NOTE — PLAN
[FreeTextEntry1] : Will contact the patient with labs, testosterone levels\par Continue on same medication

## 2021-01-20 LAB
TESTOST BND SERPL-MCNC: 3.1 PG/ML
TESTOST SERPL-MCNC: 145.9 NG/DL

## 2021-01-27 ENCOUNTER — APPOINTMENT (OUTPATIENT)
Dept: FAMILY MEDICINE | Facility: CLINIC | Age: 71
End: 2021-01-27
Payer: MEDICARE

## 2021-01-27 VITALS
SYSTOLIC BLOOD PRESSURE: 130 MMHG | BODY MASS INDEX: 37.22 KG/M2 | RESPIRATION RATE: 15 BRPM | HEIGHT: 74 IN | OXYGEN SATURATION: 95 % | DIASTOLIC BLOOD PRESSURE: 72 MMHG | HEART RATE: 76 BPM | WEIGHT: 290 LBS | TEMPERATURE: 98.8 F

## 2021-01-27 DIAGNOSIS — R53.83 OTHER FATIGUE: ICD-10-CM

## 2021-01-27 DIAGNOSIS — F32.9 MAJOR DEPRESSIVE DISORDER, SINGLE EPISODE, UNSPECIFIED: ICD-10-CM

## 2021-01-27 PROCEDURE — 99213 OFFICE O/P EST LOW 20 MIN: CPT

## 2021-01-27 NOTE — HISTORY OF PRESENT ILLNESS
[FreeTextEntry1] : Fatigue\par depressed [de-identified] : Low testosterone level use to have Tesogel prescribed by his previous physician, out of medication\par Since out of medication report increased fatigue, depressed moofd

## 2021-02-04 ENCOUNTER — APPOINTMENT (OUTPATIENT)
Dept: FAMILY MEDICINE | Facility: CLINIC | Age: 71
End: 2021-02-04
Payer: MEDICARE

## 2021-02-04 VITALS — TEMPERATURE: 97.1 F

## 2021-02-04 PROCEDURE — 96372 THER/PROPH/DIAG INJ SC/IM: CPT

## 2021-02-23 ENCOUNTER — APPOINTMENT (OUTPATIENT)
Dept: FAMILY MEDICINE | Facility: CLINIC | Age: 71
End: 2021-02-23
Payer: MEDICARE

## 2021-02-23 PROCEDURE — 96372 THER/PROPH/DIAG INJ SC/IM: CPT

## 2021-03-16 ENCOUNTER — RX RENEWAL (OUTPATIENT)
Age: 71
End: 2021-03-16

## 2021-03-16 ENCOUNTER — APPOINTMENT (OUTPATIENT)
Dept: FAMILY MEDICINE | Facility: CLINIC | Age: 71
End: 2021-03-16
Payer: MEDICARE

## 2021-03-16 PROCEDURE — 96372 THER/PROPH/DIAG INJ SC/IM: CPT

## 2021-03-16 RX ORDER — ATENOLOL 50 MG/1
50 TABLET ORAL TWICE DAILY
Qty: 90 | Refills: 3 | Status: ACTIVE | COMMUNITY
Start: 2021-03-16 | End: 1900-01-01

## 2021-04-06 ENCOUNTER — APPOINTMENT (OUTPATIENT)
Dept: FAMILY MEDICINE | Facility: CLINIC | Age: 71
End: 2021-04-06
Payer: MEDICARE

## 2021-04-06 ENCOUNTER — NON-APPOINTMENT (OUTPATIENT)
Age: 71
End: 2021-04-06

## 2021-04-06 VITALS
RESPIRATION RATE: 16 BRPM | SYSTOLIC BLOOD PRESSURE: 138 MMHG | HEIGHT: 74 IN | TEMPERATURE: 98.1 F | WEIGHT: 290 LBS | BODY MASS INDEX: 37.22 KG/M2 | HEART RATE: 75 BPM | DIASTOLIC BLOOD PRESSURE: 75 MMHG

## 2021-04-06 DIAGNOSIS — H02.403 UNSPECIFIED PTOSIS OF BILATERAL EYELIDS: ICD-10-CM

## 2021-04-06 DIAGNOSIS — I10 ESSENTIAL (PRIMARY) HYPERTENSION: ICD-10-CM

## 2021-04-06 DIAGNOSIS — Z01.818 ENCOUNTER FOR OTHER PREPROCEDURAL EXAMINATION: ICD-10-CM

## 2021-04-06 DIAGNOSIS — E66.9 OBESITY, UNSPECIFIED: ICD-10-CM

## 2021-04-06 PROCEDURE — 93000 ELECTROCARDIOGRAM COMPLETE: CPT

## 2021-04-06 PROCEDURE — 96372 THER/PROPH/DIAG INJ SC/IM: CPT

## 2021-04-06 PROCEDURE — 99215 OFFICE O/P EST HI 40 MIN: CPT | Mod: 25

## 2021-04-06 RX ORDER — TESTOSTERONE 16.2 MG/G
20.25 MG/ACT GEL TRANSDERMAL
Qty: 1 | Refills: 0 | Status: DISCONTINUED | COMMUNITY
Start: 2021-01-27 | End: 2021-04-06

## 2021-04-06 NOTE — PHYSICAL EXAM
[Normal] : soft, non-tender, non-distended, no masses palpated, no HSM and normal bowel sounds [de-identified] : palpebral ptosis

## 2021-04-06 NOTE — PLAN
[FreeTextEntry1] : Received his every 3 weeks dose of Testosterone Cyp right gluteal area\par The EKG show normal sinus rhythm no  changes from previous one\par Blood work from Jan 2021 was reviewed, no need of additional blood work at this time\par Discontinuation of Aspirin prior to surgery as per plastic surgeon decision\par The patient tolerated well previous surgeries\par Medically stable and cleared for procedure

## 2021-04-06 NOTE — HISTORY OF PRESENT ILLNESS
[FreeTextEntry1] : Bilateral upper blepharoplasty [FreeTextEntry2] : TBD [FreeTextEntry3] : Dr. Sudheer Burk [FreeTextEntry4] : Has vision difficulties due to significant upper eyelid ptosis\par Need surgical correction

## 2021-04-28 ENCOUNTER — APPOINTMENT (OUTPATIENT)
Dept: FAMILY MEDICINE | Facility: CLINIC | Age: 71
End: 2021-04-28

## 2021-05-04 ENCOUNTER — APPOINTMENT (OUTPATIENT)
Dept: FAMILY MEDICINE | Facility: CLINIC | Age: 71
End: 2021-05-04
Payer: MEDICARE

## 2021-05-04 VITALS — TEMPERATURE: 98.1 F

## 2021-05-04 DIAGNOSIS — R79.89 OTHER SPECIFIED ABNORMAL FINDINGS OF BLOOD CHEMISTRY: ICD-10-CM

## 2021-05-04 PROCEDURE — 99212 OFFICE O/P EST SF 10 MIN: CPT | Mod: 25

## 2021-05-04 PROCEDURE — 36415 COLL VENOUS BLD VENIPUNCTURE: CPT

## 2021-05-04 PROCEDURE — 96372 THER/PROPH/DIAG INJ SC/IM: CPT

## 2021-05-04 RX ORDER — TESTOSTERONE CYPIONATE 200 MG/ML
200 INJECTION, SOLUTION INTRAMUSCULAR
Qty: 10 | Refills: 0 | Status: ACTIVE | COMMUNITY
Start: 2021-02-02 | End: 1900-01-01

## 2021-05-04 NOTE — PLAN
[FreeTextEntry1] : Rechecked total and free testosterone\par received 400 mcg testosterone im\par Medication renewed

## 2021-05-04 NOTE — HISTORY OF PRESENT ILLNESS
[FreeTextEntry1] : Low testosterone [de-identified] : receiving Testosterone cyp 200 mg in every 3 weeks\par need to check the level, to recive the im dose and to renew the medication

## 2021-05-10 LAB
TESTOST BND SERPL-MCNC: 3.2 PG/ML
TESTOST SERPL-MCNC: 138.6 NG/DL